# Patient Record
(demographics unavailable — no encounter records)

---

## 2025-02-10 NOTE — PHYSICAL EXAM
[TextEntry] : General: AAO, no significant distress Psychiatric: affect pleasant and within normal limits Eyes: relatively symmetric, no obvious nystagmus Skin: no significant lesions on face Nose: no significant lesions; patent. Oral Cavity & Oropharynx: no significant deformity or lesions Neck/Lymphatics: no significant masses or abnormal cervical nodes palpated Respiratory: breathing comfortably; no significant distress Neurologic: cranial nerves II-XII grossly intact; EOMI Facial function: symmetric Bilateral TMJs with clicking and tender to palpation   Ear examination performed under binocular otologic microscope: Left Ear External: Pinna and periauricular area is normal. Canal: Ear canal skin is not inflamed or edematous. Tympanic Membrane: Intact and in good position.   Right Ear External: Pinna and periauricular area is normal. Canal: Ear canal skin is not inflamed or edematous. Tympanic Membrane: Intact and in good position.  Nasal Endoscopy:   Pre-operative Diagnosis: snoring Post-operative Diagnosis: snoring + deviated nasal septum Anesthesia: None Procedure: Bilateral nasal endoscopy Procedure Details:   The patient was placed in the seated position sitting straight up. The telescope was passed along the left nasal floor to the nasopharynx. It was then passed into the region of the middle meatus, middle turbinate, and the sphenoethmoid region. An identical procedure was performed on the right side.   Findings: Interior nasal cavity: normal bilaterally Middle and superior meatus: normal bilaterally Sphenoethmoidal recess: normal bilaterally Mucosa: normal bilaterally Nasal septum: DEVIATED TO THE LEFT Discharge: none bilaterally Turbinates: normal bilaterally Adenoid: normal bilaterally Posterior choanae: normal bilaterally Eustachian tubes: normal bilaterally Mucous stranding: normal bilaterally Lesions: Not present   Comments: NARROW NASAL PASSAGES BILATERALLY   Condition: Stable. Patient tolerated procedure well.

## 2025-02-10 NOTE — ASSESSMENT
[FreeTextEntry1] : TMJ - R>L - discussed the treatment options for TMJ - warm compresses - massage - soft diet for 3 weeks - ibuprofen 600mg three times a day for three weeks - sent to pharmacy - The potential side effects of ibuprofen were discussed at length with the patient which include but are not limited to: stomach pain, constipation, diarrhea, gas, heartburn, nausea, vomiting, dizziness, headache, rash, bleeding - Patient instructed to take ibuprofen with a meal to help prevent stomach ulcers/bleeding  R tinnitus - may be secondary to TMJ - if it does not resolve after TMJ treatment, pt will call the office for a f/u and audiogram  Snoring / Deviated nasal septum - narrow hard palate and nasal cavity bilaterally - deviated nasal septum to the left - discussed medical and surgical options - pt will continue to mouth tape

## 2025-02-10 NOTE — HISTORY OF PRESENT ILLNESS
[de-identified] : 26M who presents with acute right ear pain for the past week. He does not have a history of teeth grinding or clenching. He has had more stress at work recently. He endorses right sided nonpulsatile tinnitus that is nonbothersome and comes and goes. He also has a history of snoring according to his partner. No known allergies. His hard palate is narrow.

## 2025-03-26 NOTE — ASSESSMENT
[FreeTextEntry1] : 1) r asymm vhf snhl and new onset tinnitus- could have had sosnhl hearing too good to consider IT dex. Explained low chance but could try po prednisone, denies htn dm pud infx or psych; he wished to try. May also help tmj ordered omeprazole rtc 1 week with new  and mri 2) tinnitus explained it can be form hl and from tmj. discussed inc bckgd noise masking trt- information sheet given to pt 3) tmj- soft diet, avoid bruxism, gum, rec see Dr Myrick- asst gave him contact info 4) he has been dxd with snoring by others in this practice - he said this is minor and did not want to address it

## 2025-03-26 NOTE — HISTORY OF PRESENT ILLNESS
[de-identified] : 25 yo m with 2 mo h/o r tinnitus. In addn, had a lot of pain in r preauricular and l bothering him but less could not be in areas with noise. It lasted a week and faded out painful to touch. Diagnosed with possible tmj. He saw dentist who disagrees. He continues to hear a low buzzing noise. continuous. random throughout the day lasts 2 min at a time in the r ear. Denies hl. no fh relevant to cc.; nonsmoker. has tried ibuprofen for tmj. Has not helped the sound. Denies vertgo.

## 2025-03-26 NOTE — DATA REVIEWED
[de-identified] : normal up to 9 KHz and symmetric; significant discrepancy r worse than l at very hi freqs - reviewed with pt

## 2025-03-26 NOTE — PHYSICAL EXAM
[Midline] : trachea located in midline position [Normal] : no neck adenopathy [de-identified] : b acute [de-identified] : gait steady

## 2025-04-28 NOTE — HISTORY OF PRESENT ILLNESS
[de-identified] : 1 mo follow up visit for this 27 y/o M here to discuss MRI for eval of r asymm vhf snhl and new onset r tinnitus. Was started on PO prednisone for sudden onset of r snhl. States that tinnitus has not improved since last visit. Hewas also found to have significant tmj.

## 2025-04-28 NOTE — DATA REVIEWED
[de-identified] : audio exam: asymm vhf snhl AD, hearing wnl AS, type A tymps AU, no change compared to audio from 3/26/2025 -results reviewed with patient [de-identified] : MRI: no acoustic neuroma noted, mild l mastoid cell opacification - results reviewed with patient

## 2025-04-28 NOTE — ASSESSMENT
[FreeTextEntry1] : 1. tinnitus of R ear likely d/t TMJ -MRI: no acoustic neuroma noted -results reviewed with patient -audio exam: asymm vhf snhl AD, hearing wnl AS, type A tymps AU, no change compared to audio from 3/26/2025 -results reviewed with patient -soft foods -motrin prn -avoid bruxism -contact info for Dr. Myrick, Dr Rowley,   provided; asked to call with their impression and to follow up if not improved

## 2025-04-28 NOTE — PHYSICAL EXAM
[Normal] : assessment of respiratory effort is normal [de-identified] : b significant acute [de-identified] : gait steady